# Patient Record
(demographics unavailable — no encounter records)

---

## 2025-03-19 NOTE — ASSESSMENT
[FreeTextEntry1] : 74 year old F with HTN, HLD, DM who presents for cardiac evaluation of chest discomfort.  1) Chest discomfort, atypical in nature, improves with exertion 2) HTN, HLD, DM - EKG showed sinus rhythm without significant ischemic changes - I advised pt to undergo treadmill stress test. If equivocal, will plan for CCTA - I advised pt to undergo TTE 3/19/25 which showed normal LVEF 65-70%, normal RV size/function and no significant valvular disease  3) Follow-up, pending stress test

## 2025-03-19 NOTE — HISTORY OF PRESENT ILLNESS
[FreeTextEntry1] : 74 year old F with HTN, HLD, DM who presents for cardiac evaluation of chest discomfort.  EKG showed sinus rhythm without significant ischemic changes Meds: vascepa, myrbetriq 25mg, omeprazole 40 mg, amlodipine 5mg, lipitor 10 mg, metformin 500 mg, magnesium 400 mg, ASA 81mg  Pt reports mid-sternal chest discomfort x 1 month. It usually occurs when she is laying down or sleeping. When she gets up and walk around she feels better. She states she tried "Jiuxin" pills x 3 times in the past with some improvement. She denies SOB, palpitation, dizziness, LOC, orthopnea, PND, or LE edema.

## 2025-06-10 NOTE — ASSESSMENT
[FreeTextEntry1] : 74 year old F with HTN, HLD, DM who presents for f/u.  1) Chest discomfort, atypical in nature, improves with exertion, resolved 2) PVCs - Repeat EKG 6/10/25 showed sinus rhythm with ventricular trigeminy - TTE 3/19/25 showed normal LVEF 65-70%, normal RV size/function and no significant valvular disease - She does not want to pursue treadmill stress test or coronary CT given lack of symptoms at this time.  - She states her PVCs are related to her poor sleep and she does not want medications or Holter monitoring at this time. - I advised pt to monitor her symptoms closely. ED precautions reviewed with patient and her , who endorsed understanding  3) Follow-up, 3 months or sooner if symptomatic

## 2025-06-10 NOTE — REASON FOR VISIT
[Symptom and Test Evaluation] : symptom and test evaluation [CV Risk Factors and Non-Cardiac Disease] : CV risk factors and non-cardiac disease [Arrhythmia/ECG Abnorrmalities] : arrhythmia/ECG abnormalities [FreeTextEntry1] : 74 year old F with HTN, HLD, DM who presents for f/u.  EKG showed sinus rhythm without significant ischemic changes Meds: vascepa, myrbetriq 25mg, omeprazole 40 mg, amlodipine 5mg, lipitor 10 mg, metformin 500 mg, magnesium 400 mg, ASA 81mg  TTE 3/19/25 showed normal LVEF 65-70%, normal RV size/function and no significant valvular disease

## 2025-06-10 NOTE — HISTORY OF PRESENT ILLNESS
[FreeTextEntry1] : Pt reports feeling well since last visit. She has not had chest pain or SOB. She denies palpitation, dizziness, LOC, orthopnea, PND, or LE edema. She reports poor sleep last night.  3/19/25 - Pt reports mid-sternal chest discomfort x 1 month. It usually occurs when she is laying down or sleeping. When she gets up and walk around she feels better. She states she tried "Jiuxin" pills x 3 times in the past with some improvement. She denies SOB, palpitation, dizziness, LOC, orthopnea, PND, or LE edema.